# Patient Record
Sex: FEMALE | Race: BLACK OR AFRICAN AMERICAN | NOT HISPANIC OR LATINO | Employment: FULL TIME | URBAN - METROPOLITAN AREA
[De-identification: names, ages, dates, MRNs, and addresses within clinical notes are randomized per-mention and may not be internally consistent; named-entity substitution may affect disease eponyms.]

---

## 2021-03-03 DIAGNOSIS — Z23 NEED FOR VACCINATION: ICD-10-CM

## 2023-02-16 ENCOUNTER — HOSPITAL ENCOUNTER (OUTPATIENT)
Dept: RADIOLOGY | Facility: MEDICAL CENTER | Age: 68
End: 2023-02-16
Attending: FAMILY MEDICINE
Payer: COMMERCIAL

## 2023-02-16 ENCOUNTER — OFFICE VISIT (OUTPATIENT)
Dept: MEDICAL GROUP | Facility: MEDICAL CENTER | Age: 68
End: 2023-02-16
Payer: COMMERCIAL

## 2023-02-16 VITALS
BODY MASS INDEX: 31.52 KG/M2 | DIASTOLIC BLOOD PRESSURE: 78 MMHG | HEART RATE: 68 BPM | SYSTOLIC BLOOD PRESSURE: 126 MMHG | OXYGEN SATURATION: 98 % | WEIGHT: 208 LBS | RESPIRATION RATE: 16 BRPM | HEIGHT: 68 IN | TEMPERATURE: 97.6 F

## 2023-02-16 DIAGNOSIS — E66.9 OBESITY (BMI 30-39.9): ICD-10-CM

## 2023-02-16 DIAGNOSIS — Z12.31 ENCOUNTER FOR SCREENING MAMMOGRAM FOR MALIGNANT NEOPLASM OF BREAST: ICD-10-CM

## 2023-02-16 DIAGNOSIS — M25.551 RIGHT HIP PAIN: ICD-10-CM

## 2023-02-16 DIAGNOSIS — Z13.6 SCREENING FOR CARDIOVASCULAR CONDITION: ICD-10-CM

## 2023-02-16 DIAGNOSIS — Z12.11 SCREEN FOR COLON CANCER: ICD-10-CM

## 2023-02-16 DIAGNOSIS — Z11.59 NEED FOR HEPATITIS C SCREENING TEST: ICD-10-CM

## 2023-02-16 DIAGNOSIS — Z13.29 SCREENING FOR ENDOCRINE, METABOLIC AND IMMUNITY DISORDER: ICD-10-CM

## 2023-02-16 DIAGNOSIS — Z13.228 SCREENING FOR ENDOCRINE, METABOLIC AND IMMUNITY DISORDER: ICD-10-CM

## 2023-02-16 DIAGNOSIS — Z13.820 SCREENING FOR OSTEOPOROSIS: ICD-10-CM

## 2023-02-16 DIAGNOSIS — Z13.0 SCREENING FOR ENDOCRINE, METABOLIC AND IMMUNITY DISORDER: ICD-10-CM

## 2023-02-16 DIAGNOSIS — Z78.0 POSTMENOPAUSAL: ICD-10-CM

## 2023-02-16 PROCEDURE — 73501 X-RAY EXAM HIP UNI 1 VIEW: CPT | Mod: RT

## 2023-02-16 PROCEDURE — 99203 OFFICE O/P NEW LOW 30 MIN: CPT | Performed by: FAMILY MEDICINE

## 2023-02-16 RX ORDER — MELOXICAM 15 MG/1
TABLET ORAL
COMMUNITY
End: 2023-02-16

## 2023-02-16 ASSESSMENT — PATIENT HEALTH QUESTIONNAIRE - PHQ9: CLINICAL INTERPRETATION OF PHQ2 SCORE: 0

## 2023-02-16 NOTE — PROGRESS NOTES
"  Subjective:     All Chung is a 68 y.o. female presenting to establish care.      No current outpatient medications on file.    Objective:     Vitals: /78   Pulse 68   Temp 36.4 °C (97.6 °F)   Resp 16   Ht 1.727 m (5' 8\")   Wt 94.3 kg (208 lb)   SpO2 98%   BMI 31.63 kg/m²   General: Alert  HEENT: Normocephalic.   Heart: Regular rate and rhythm.  S1 and S2 normal.  No murmurs appreciated.  Respiratory: Normal respiratory effort.  Clear to auscultation bilaterally.  Abdomen: Non-distended, soft  Extremities: No leg edema.    Assessment/Plan:     All was seen today for [facility].    Diagnoses and all orders for this visit:    Right hip pain  Chronic, worsening.  She reports right groin pain for the past 3 years or so that seems to be worsening.  Is not limiting her in her activities yet, but she did see orthopedics in the past.  Surgery was recommended at the time, but she was hesitant to proceed.  She would like to monitor for now.  She has been trying to lose weight, eats healthy overall.  She has not been exercising much, but plans to start walking more regularly.  -     DX-HIP-UNILATERAL-WITH PELVIS-1 VIEW RIGHT; Future    Obesity (BMI 30-39.9)  Chronic, stable, we discussed a healthy diet, regular exercise.  She is not interested in medication options at this time.  We will continue to monitor.  Follow-up in 3 months.  -     Patient identified as having weight management issue.  Appropriate orders and counseling given.    Screening for endocrine, metabolic and immunity disorder  -     CBC WITHOUT DIFFERENTIAL; Future  -     Comp Metabolic Panel; Future  -     TSH WITH REFLEX TO FT4; Future    Screening for cardiovascular condition  -     Lipid Profile; Future    Need for hepatitis C screening test  -     HEP C VIRUS ANTIBODY; Future    Encounter for screening mammogram for malignant neoplasm of breast  -     MA-SCREENING MAMMO BILAT W/TOMOSYNTHESIS W/CAD; Future    Screen for colon " cancer  -     OCCULT BLOOD FECES IMMUNOASSAY; Future    Screening for osteoporosis  Postmenopausal  -     DS-BONE DENSITY STUDY (DEXA); Future          Return in about 3 months (around 5/16/2023) for routine follow up.

## 2023-02-23 ENCOUNTER — HOSPITAL ENCOUNTER (OUTPATIENT)
Dept: LAB | Facility: MEDICAL CENTER | Age: 68
End: 2023-02-23
Attending: FAMILY MEDICINE
Payer: COMMERCIAL

## 2023-02-23 DIAGNOSIS — Z11.59 NEED FOR HEPATITIS C SCREENING TEST: ICD-10-CM

## 2023-02-23 DIAGNOSIS — Z13.228 SCREENING FOR ENDOCRINE, METABOLIC AND IMMUNITY DISORDER: ICD-10-CM

## 2023-02-23 DIAGNOSIS — Z13.0 SCREENING FOR ENDOCRINE, METABOLIC AND IMMUNITY DISORDER: ICD-10-CM

## 2023-02-23 DIAGNOSIS — Z13.29 SCREENING FOR ENDOCRINE, METABOLIC AND IMMUNITY DISORDER: ICD-10-CM

## 2023-02-23 DIAGNOSIS — Z13.6 SCREENING FOR CARDIOVASCULAR CONDITION: ICD-10-CM

## 2023-02-23 LAB
ALBUMIN SERPL BCP-MCNC: 4.4 G/DL (ref 3.2–4.9)
ALBUMIN/GLOB SERPL: 1.3 G/DL
ALP SERPL-CCNC: 105 U/L (ref 30–99)
ALT SERPL-CCNC: 14 U/L (ref 2–50)
ANION GAP SERPL CALC-SCNC: 17 MMOL/L (ref 7–16)
AST SERPL-CCNC: 27 U/L (ref 12–45)
BILIRUB SERPL-MCNC: 0.6 MG/DL (ref 0.1–1.5)
BUN SERPL-MCNC: 10 MG/DL (ref 8–22)
CALCIUM ALBUM COR SERPL-MCNC: 9.7 MG/DL (ref 8.5–10.5)
CALCIUM SERPL-MCNC: 10 MG/DL (ref 8.5–10.5)
CHLORIDE SERPL-SCNC: 103 MMOL/L (ref 96–112)
CHOLEST SERPL-MCNC: 204 MG/DL (ref 100–199)
CO2 SERPL-SCNC: 19 MMOL/L (ref 20–33)
CREAT SERPL-MCNC: 0.81 MG/DL (ref 0.5–1.4)
ERYTHROCYTE [DISTWIDTH] IN BLOOD BY AUTOMATED COUNT: 41.2 FL (ref 35.9–50)
GFR SERPLBLD CREATININE-BSD FMLA CKD-EPI: 79 ML/MIN/1.73 M 2
GLOBULIN SER CALC-MCNC: 3.4 G/DL (ref 1.9–3.5)
GLUCOSE SERPL-MCNC: 73 MG/DL (ref 65–99)
HCT VFR BLD AUTO: 51.3 % (ref 37–47)
HCV AB SER QL: NORMAL
HDLC SERPL-MCNC: 86 MG/DL
HGB BLD-MCNC: 17.3 G/DL (ref 12–16)
LDLC SERPL CALC-MCNC: 104 MG/DL
MCH RBC QN AUTO: 30.6 PG (ref 27–33)
MCHC RBC AUTO-ENTMCNC: 33.7 G/DL (ref 33.6–35)
MCV RBC AUTO: 90.8 FL (ref 81.4–97.8)
PLATELET # BLD AUTO: 246 K/UL (ref 164–446)
PMV BLD AUTO: 10.5 FL (ref 9–12.9)
POTASSIUM SERPL-SCNC: 4.2 MMOL/L (ref 3.6–5.5)
PROT SERPL-MCNC: 7.8 G/DL (ref 6–8.2)
RBC # BLD AUTO: 5.65 M/UL (ref 4.2–5.4)
SODIUM SERPL-SCNC: 139 MMOL/L (ref 135–145)
TRIGL SERPL-MCNC: 72 MG/DL (ref 0–149)
TSH SERPL DL<=0.005 MIU/L-ACNC: 1.39 UIU/ML (ref 0.38–5.33)
WBC # BLD AUTO: 7.1 K/UL (ref 4.8–10.8)

## 2023-02-23 PROCEDURE — 86803 HEPATITIS C AB TEST: CPT

## 2023-02-23 PROCEDURE — 36415 COLL VENOUS BLD VENIPUNCTURE: CPT

## 2023-02-23 PROCEDURE — 80053 COMPREHEN METABOLIC PANEL: CPT

## 2023-02-23 PROCEDURE — 84443 ASSAY THYROID STIM HORMONE: CPT

## 2023-02-23 PROCEDURE — 85027 COMPLETE CBC AUTOMATED: CPT

## 2023-02-23 PROCEDURE — 80061 LIPID PANEL: CPT

## 2023-02-24 PROBLEM — D58.2 ELEVATED HEMOGLOBIN (HCC): Status: ACTIVE | Noted: 2023-02-24

## 2023-04-13 ENCOUNTER — APPOINTMENT (OUTPATIENT)
Dept: RADIOLOGY | Facility: MEDICAL CENTER | Age: 68
End: 2023-04-13
Payer: COMMERCIAL

## 2023-05-23 ENCOUNTER — HOSPITAL ENCOUNTER (OUTPATIENT)
Dept: RADIOLOGY | Facility: MEDICAL CENTER | Age: 68
End: 2023-05-23
Attending: FAMILY MEDICINE
Payer: COMMERCIAL

## 2023-05-23 DIAGNOSIS — Z78.0 POSTMENOPAUSAL: ICD-10-CM

## 2023-05-23 DIAGNOSIS — Z13.820 SCREENING FOR OSTEOPOROSIS: ICD-10-CM

## 2023-05-23 DIAGNOSIS — Z12.31 ENCOUNTER FOR SCREENING MAMMOGRAM FOR MALIGNANT NEOPLASM OF BREAST: ICD-10-CM

## 2023-05-23 PROBLEM — M85.88 OSTEOPENIA OF LUMBAR SPINE: Status: ACTIVE | Noted: 2023-05-23

## 2023-05-23 PROCEDURE — 77080 DXA BONE DENSITY AXIAL: CPT

## 2023-05-23 PROCEDURE — 77063 BREAST TOMOSYNTHESIS BI: CPT

## 2023-06-14 ENCOUNTER — HOSPITAL ENCOUNTER (OUTPATIENT)
Dept: LAB | Facility: MEDICAL CENTER | Age: 68
End: 2023-06-14
Attending: FAMILY MEDICINE
Payer: COMMERCIAL

## 2023-06-14 ENCOUNTER — OFFICE VISIT (OUTPATIENT)
Dept: MEDICAL GROUP | Facility: MEDICAL CENTER | Age: 68
End: 2023-06-14
Payer: COMMERCIAL

## 2023-06-14 ENCOUNTER — HOSPITAL ENCOUNTER (OUTPATIENT)
Dept: RADIOLOGY | Facility: MEDICAL CENTER | Age: 68
End: 2023-06-14
Attending: FAMILY MEDICINE
Payer: COMMERCIAL

## 2023-06-14 VITALS
WEIGHT: 209 LBS | TEMPERATURE: 96.2 F | HEIGHT: 67 IN | HEART RATE: 78 BPM | DIASTOLIC BLOOD PRESSURE: 64 MMHG | SYSTOLIC BLOOD PRESSURE: 132 MMHG | BODY MASS INDEX: 32.8 KG/M2 | OXYGEN SATURATION: 94 %

## 2023-06-14 DIAGNOSIS — D58.2 ELEVATED HEMOGLOBIN (HCC): ICD-10-CM

## 2023-06-14 DIAGNOSIS — R58 ECCHYMOSIS: ICD-10-CM

## 2023-06-14 DIAGNOSIS — M79.674 PAIN OF TOE OF RIGHT FOOT: ICD-10-CM

## 2023-06-14 DIAGNOSIS — E66.9 OBESITY (BMI 30-39.9): ICD-10-CM

## 2023-06-14 LAB
APPEARANCE UR: CLEAR
BASOPHILS # BLD AUTO: 0.9 % (ref 0–1.8)
BASOPHILS # BLD: 0.06 K/UL (ref 0–0.12)
BILIRUB UR QL STRIP.AUTO: NEGATIVE
COLOR UR: YELLOW
EOSINOPHIL # BLD AUTO: 0.1 K/UL (ref 0–0.51)
EOSINOPHIL NFR BLD: 1.5 % (ref 0–6.9)
ERYTHROCYTE [DISTWIDTH] IN BLOOD BY AUTOMATED COUNT: 42.1 FL (ref 35.9–50)
ERYTHROCYTE [SEDIMENTATION RATE] IN BLOOD BY WESTERGREN METHOD: 8 MM/HOUR (ref 0–25)
GLUCOSE UR STRIP.AUTO-MCNC: NEGATIVE MG/DL
HCT VFR BLD AUTO: 49.3 % (ref 37–47)
HGB BLD-MCNC: 16.2 G/DL (ref 12–16)
IMM GRANULOCYTES # BLD AUTO: 0.02 K/UL (ref 0–0.11)
IMM GRANULOCYTES NFR BLD AUTO: 0.3 % (ref 0–0.9)
INR PPP: 0.98 (ref 0.87–1.13)
KETONES UR STRIP.AUTO-MCNC: NEGATIVE MG/DL
LEUKOCYTE ESTERASE UR QL STRIP.AUTO: NEGATIVE
LYMPHOCYTES # BLD AUTO: 2.44 K/UL (ref 1–4.8)
LYMPHOCYTES NFR BLD: 35.9 % (ref 22–41)
MCH RBC QN AUTO: 29.7 PG (ref 27–33)
MCHC RBC AUTO-ENTMCNC: 32.9 G/DL (ref 32.2–35.5)
MCV RBC AUTO: 90.3 FL (ref 81.4–97.8)
MICRO URNS: NORMAL
MONOCYTES # BLD AUTO: 0.73 K/UL (ref 0–0.85)
MONOCYTES NFR BLD AUTO: 10.8 % (ref 0–13.4)
NEUTROPHILS # BLD AUTO: 3.44 K/UL (ref 1.82–7.42)
NEUTROPHILS NFR BLD: 50.6 % (ref 44–72)
NITRITE UR QL STRIP.AUTO: NEGATIVE
NRBC # BLD AUTO: 0 K/UL
NRBC BLD-RTO: 0 /100 WBC (ref 0–0.2)
PH UR STRIP.AUTO: 6 [PH] (ref 5–8)
PLATELET # BLD AUTO: 232 K/UL (ref 164–446)
PMV BLD AUTO: 10.5 FL (ref 9–12.9)
PROT UR QL STRIP: NEGATIVE MG/DL
PROTHROMBIN TIME: 12.9 SEC (ref 12–14.6)
RBC # BLD AUTO: 5.46 M/UL (ref 4.2–5.4)
RBC UR QL AUTO: NEGATIVE
SP GR UR STRIP.AUTO: 1.01
UROBILINOGEN UR STRIP.AUTO-MCNC: 0.2 MG/DL
VIT B12 SERPL-MCNC: 1897 PG/ML (ref 211–911)
WBC # BLD AUTO: 6.8 K/UL (ref 4.8–10.8)

## 2023-06-14 PROCEDURE — 86140 C-REACTIVE PROTEIN: CPT

## 2023-06-14 PROCEDURE — 82607 VITAMIN B-12: CPT

## 2023-06-14 PROCEDURE — 84165 PROTEIN E-PHORESIS SERUM: CPT

## 2023-06-14 PROCEDURE — 3075F SYST BP GE 130 - 139MM HG: CPT | Performed by: FAMILY MEDICINE

## 2023-06-14 PROCEDURE — 81003 URINALYSIS AUTO W/O SCOPE: CPT

## 2023-06-14 PROCEDURE — 36415 COLL VENOUS BLD VENIPUNCTURE: CPT

## 2023-06-14 PROCEDURE — 73630 X-RAY EXAM OF FOOT: CPT | Mod: RT

## 2023-06-14 PROCEDURE — 85025 COMPLETE CBC W/AUTO DIFF WBC: CPT

## 2023-06-14 PROCEDURE — 84155 ASSAY OF PROTEIN SERUM: CPT

## 2023-06-14 PROCEDURE — 85652 RBC SED RATE AUTOMATED: CPT

## 2023-06-14 PROCEDURE — 99214 OFFICE O/P EST MOD 30 MIN: CPT | Performed by: FAMILY MEDICINE

## 2023-06-14 PROCEDURE — 85610 PROTHROMBIN TIME: CPT

## 2023-06-14 PROCEDURE — 80053 COMPREHEN METABOLIC PANEL: CPT

## 2023-06-14 PROCEDURE — 3078F DIAST BP <80 MM HG: CPT | Performed by: FAMILY MEDICINE

## 2023-06-14 ASSESSMENT — FIBROSIS 4 INDEX: FIB4 SCORE: 1.97

## 2023-06-14 NOTE — PROGRESS NOTES
"  Subjective:     All Chung is a 68 y.o. female presenting for a follow-up      No current outpatient medications on file.    Objective:     Vitals: /64 (BP Location: Right arm, Patient Position: Sitting, BP Cuff Size: Adult)   Pulse 78   Temp (!) 35.7 °C (96.2 °F) (Temporal)   Ht 1.702 m (5' 7\")   Wt 94.8 kg (208 lb 15.9 oz)   SpO2 94%   BMI 32.73 kg/m²   General: Alert  HEENT: Normocephalic.   Extremities: No leg edema.  Ecchymosis present at the base of the right fourth toe    Assessment/Plan:     All was seen today for hip pain.    Diagnoses and all orders for this visit:    Pain of toe of right foot  Ecchymosis  Acute, uncomplicated issue.  She noticed bruising and discomfort on the base of her right fourth toe a couple days ago.  Seems to be improving.  Associated with some tingling in the toes.  We discussed checking x-rays  -     DX-FOOT-COMPLETE 3+ RIGHT; Future    Elevated hemoglobin (HCC)  Undiagnosed new problem with uncertain prognosis.  We discussed her last labs.  Her hemoglobin level is slightly elevated.  We discussed rechecking with the following other labs.  She eats healthy.  She has been working on exercising more.  She denies any sleep apnea symptoms.  We discussed a referral to hematology if her hemoglobin remains elevated.  -     CBC WITH DIFFERENTIAL; Future  -     URINALYSIS,CULTURE IF INDICATED; Future  -     Comp Metabolic Panel; Future  -     VITAMIN B12; Future  -     Prothrombin Time; Future  -     SPEP W/REFLEX TO MAYTE, A, G, M; Future  -     Sed Rate; Future  -     CRP QUANTITIVE (NON-CARDIAC); Future    Obesity (BMI 30-39.9)  Chronic, stable.  She continues to eat healthy.  She has been working on trying exercise.  We will continue to monitor.  Follow-up in 3 months        Return in about 3 months (around 9/14/2023) for routine follow up.      "

## 2023-06-15 LAB
ALBUMIN SERPL BCP-MCNC: 4 G/DL (ref 3.2–4.9)
ALBUMIN/GLOB SERPL: 1.3 G/DL
ALP SERPL-CCNC: 105 U/L (ref 30–99)
ALT SERPL-CCNC: 15 U/L (ref 2–50)
ANION GAP SERPL CALC-SCNC: 11 MMOL/L (ref 7–16)
AST SERPL-CCNC: 16 U/L (ref 12–45)
BILIRUB SERPL-MCNC: 0.5 MG/DL (ref 0.1–1.5)
BUN SERPL-MCNC: 9 MG/DL (ref 8–22)
CALCIUM ALBUM COR SERPL-MCNC: 9.5 MG/DL (ref 8.5–10.5)
CALCIUM SERPL-MCNC: 9.5 MG/DL (ref 8.5–10.5)
CHLORIDE SERPL-SCNC: 103 MMOL/L (ref 96–112)
CO2 SERPL-SCNC: 25 MMOL/L (ref 20–33)
CREAT SERPL-MCNC: 0.85 MG/DL (ref 0.5–1.4)
CRP SERPL HS-MCNC: 1.03 MG/DL (ref 0–0.75)
GFR SERPLBLD CREATININE-BSD FMLA CKD-EPI: 75 ML/MIN/1.73 M 2
GLOBULIN SER CALC-MCNC: 3.2 G/DL (ref 1.9–3.5)
GLUCOSE SERPL-MCNC: 78 MG/DL (ref 65–99)
POTASSIUM SERPL-SCNC: 3.8 MMOL/L (ref 3.6–5.5)
PROT SERPL-MCNC: 7.2 G/DL (ref 6–8.2)
SODIUM SERPL-SCNC: 139 MMOL/L (ref 135–145)

## 2023-06-17 LAB
ALBUMIN SERPL ELPH-MCNC: 3.96 G/DL (ref 3.75–5.01)
ALPHA1 GLOB SERPL ELPH-MCNC: 0.32 G/DL (ref 0.19–0.46)
ALPHA2 GLOB SERPL ELPH-MCNC: 0.71 G/DL (ref 0.48–1.05)
B-GLOBULIN SERPL ELPH-MCNC: 0.88 G/DL (ref 0.48–1.1)
GAMMA GLOB SERPL ELPH-MCNC: 1.14 G/DL (ref 0.62–1.51)
INTERPRETATION SERPL IFE-IMP: NORMAL
MONOCLON BAND OBS SERPL: NORMAL
MONOCLONAL PROTEIN NL11656: NORMAL G/DL
PATHOLOGY STUDY: NORMAL
PROT SERPL-MCNC: 7 G/DL (ref 6.3–8.2)

## 2023-10-09 ENCOUNTER — DOCUMENTATION (OUTPATIENT)
Dept: HEALTH INFORMATION MANAGEMENT | Facility: OTHER | Age: 68
End: 2023-10-09
Payer: COMMERCIAL

## 2023-12-29 ENCOUNTER — HOSPITAL ENCOUNTER (EMERGENCY)
Facility: MEDICAL CENTER | Age: 68
End: 2024-01-01
Payer: COMMERCIAL

## 2023-12-29 ENCOUNTER — APPOINTMENT (OUTPATIENT)
Dept: RADIOLOGY | Facility: MEDICAL CENTER | Age: 68
End: 2023-12-29
Attending: EMERGENCY MEDICINE
Payer: COMMERCIAL

## 2023-12-29 ENCOUNTER — HOSPITAL ENCOUNTER (EMERGENCY)
Facility: MEDICAL CENTER | Age: 68
End: 2023-12-29
Attending: EMERGENCY MEDICINE
Payer: COMMERCIAL

## 2023-12-29 VITALS
DIASTOLIC BLOOD PRESSURE: 78 MMHG | OXYGEN SATURATION: 97 % | HEIGHT: 67 IN | RESPIRATION RATE: 17 BRPM | TEMPERATURE: 97.7 F | WEIGHT: 202.82 LBS | BODY MASS INDEX: 31.83 KG/M2 | SYSTOLIC BLOOD PRESSURE: 147 MMHG | HEART RATE: 88 BPM

## 2023-12-29 DIAGNOSIS — R03.0 ELEVATED BLOOD PRESSURE READING: ICD-10-CM

## 2023-12-29 DIAGNOSIS — R10.9 FLANK PAIN: ICD-10-CM

## 2023-12-29 DIAGNOSIS — D25.9 UTERINE LEIOMYOMA, UNSPECIFIED LOCATION: ICD-10-CM

## 2023-12-29 DIAGNOSIS — R91.1 NODULE OF RIGHT LUNG: ICD-10-CM

## 2023-12-29 LAB
ALBUMIN SERPL BCP-MCNC: 4.2 G/DL (ref 3.2–4.9)
ALBUMIN/GLOB SERPL: 1.2 G/DL
ALP SERPL-CCNC: 108 U/L (ref 30–99)
ALT SERPL-CCNC: 17 U/L (ref 2–50)
ANION GAP SERPL CALC-SCNC: 14 MMOL/L (ref 7–16)
APPEARANCE UR: CLEAR
AST SERPL-CCNC: 22 U/L (ref 12–45)
BASOPHILS # BLD AUTO: 0.6 % (ref 0–1.8)
BASOPHILS # BLD: 0.04 K/UL (ref 0–0.12)
BILIRUB SERPL-MCNC: 0.6 MG/DL (ref 0.1–1.5)
BILIRUB UR QL STRIP.AUTO: NEGATIVE
BUN SERPL-MCNC: 10 MG/DL (ref 8–22)
CALCIUM ALBUM COR SERPL-MCNC: 9.7 MG/DL (ref 8.5–10.5)
CALCIUM SERPL-MCNC: 9.9 MG/DL (ref 8.5–10.5)
CHLORIDE SERPL-SCNC: 102 MMOL/L (ref 96–112)
CO2 SERPL-SCNC: 20 MMOL/L (ref 20–33)
COLOR UR: YELLOW
CREAT SERPL-MCNC: 0.86 MG/DL (ref 0.5–1.4)
EOSINOPHIL # BLD AUTO: 0.07 K/UL (ref 0–0.51)
EOSINOPHIL NFR BLD: 1 % (ref 0–6.9)
ERYTHROCYTE [DISTWIDTH] IN BLOOD BY AUTOMATED COUNT: 42.3 FL (ref 35.9–50)
GFR SERPLBLD CREATININE-BSD FMLA CKD-EPI: 74 ML/MIN/1.73 M 2
GLOBULIN SER CALC-MCNC: 3.6 G/DL (ref 1.9–3.5)
GLUCOSE SERPL-MCNC: 81 MG/DL (ref 65–99)
GLUCOSE UR STRIP.AUTO-MCNC: NEGATIVE MG/DL
HCT VFR BLD AUTO: 50.7 % (ref 37–47)
HGB BLD-MCNC: 16.9 G/DL (ref 12–16)
IMM GRANULOCYTES # BLD AUTO: 0.01 K/UL (ref 0–0.11)
IMM GRANULOCYTES NFR BLD AUTO: 0.1 % (ref 0–0.9)
KETONES UR STRIP.AUTO-MCNC: ABNORMAL MG/DL
LEUKOCYTE ESTERASE UR QL STRIP.AUTO: NEGATIVE
LIPASE SERPL-CCNC: 43 U/L (ref 11–82)
LYMPHOCYTES # BLD AUTO: 2.47 K/UL (ref 1–4.8)
LYMPHOCYTES NFR BLD: 36.1 % (ref 22–41)
MCH RBC QN AUTO: 30 PG (ref 27–33)
MCHC RBC AUTO-ENTMCNC: 33.3 G/DL (ref 32.2–35.5)
MCV RBC AUTO: 90.1 FL (ref 81.4–97.8)
MICRO URNS: ABNORMAL
MONOCYTES # BLD AUTO: 0.68 K/UL (ref 0–0.85)
MONOCYTES NFR BLD AUTO: 9.9 % (ref 0–13.4)
NEUTROPHILS # BLD AUTO: 3.57 K/UL (ref 1.82–7.42)
NEUTROPHILS NFR BLD: 52.3 % (ref 44–72)
NITRITE UR QL STRIP.AUTO: NEGATIVE
NRBC # BLD AUTO: 0 K/UL
NRBC BLD-RTO: 0 /100 WBC (ref 0–0.2)
PH UR STRIP.AUTO: 5 [PH] (ref 5–8)
PLATELET # BLD AUTO: 224 K/UL (ref 164–446)
PMV BLD AUTO: 10.2 FL (ref 9–12.9)
POTASSIUM SERPL-SCNC: 4.2 MMOL/L (ref 3.6–5.5)
PROT SERPL-MCNC: 7.8 G/DL (ref 6–8.2)
PROT UR QL STRIP: NEGATIVE MG/DL
RBC # BLD AUTO: 5.63 M/UL (ref 4.2–5.4)
RBC UR QL AUTO: NEGATIVE
SODIUM SERPL-SCNC: 136 MMOL/L (ref 135–145)
SP GR UR STRIP.AUTO: 1.01
UROBILINOGEN UR STRIP.AUTO-MCNC: 0.2 MG/DL
WBC # BLD AUTO: 6.8 K/UL (ref 4.8–10.8)

## 2023-12-29 PROCEDURE — 85025 COMPLETE CBC W/AUTO DIFF WBC: CPT

## 2023-12-29 PROCEDURE — 36415 COLL VENOUS BLD VENIPUNCTURE: CPT

## 2023-12-29 PROCEDURE — 700117 HCHG RX CONTRAST REV CODE 255: Performed by: EMERGENCY MEDICINE

## 2023-12-29 PROCEDURE — 81003 URINALYSIS AUTO W/O SCOPE: CPT

## 2023-12-29 PROCEDURE — 80053 COMPREHEN METABOLIC PANEL: CPT

## 2023-12-29 PROCEDURE — 83690 ASSAY OF LIPASE: CPT

## 2023-12-29 PROCEDURE — 99284 EMERGENCY DEPT VISIT MOD MDM: CPT

## 2023-12-29 PROCEDURE — 74177 CT ABD & PELVIS W/CONTRAST: CPT

## 2023-12-29 RX ADMIN — IOHEXOL 100 ML: 350 INJECTION, SOLUTION INTRAVENOUS at 15:15

## 2023-12-29 NOTE — ED PROVIDER NOTES
"ED Provider Note    CHIEF COMPLAINT  Chief Complaint   Patient presents with    Abdominal Pain     L sided flank pain that radiates front to back x1 week. Pt denies dysuria, or N/V           HPI/ROS    All Chung is a 68 y.o. female who presents to the emergency department complaining of left flank and low back pain.  The patient has had the symptoms on and off for quite a long time and has spoken to her previous primary care doctor about this but no diagnosis has been made.  Over the last 1 week the pain has gotten worse than usual and seems to be constant over the last 1 week.  She does not recognize any specific exacerbating or alleviating factors or precipitating events.  Pain is mild to moderate.  Review of systems: No respiratory symptoms or shortness of breath no hemoptysis or chest pain no pain or discomfort with urination no difficulty with bowel movement.  There is been no trauma.    PAST MEDICAL HISTORY       SURGICAL HISTORY  patient denies any surgical history    FAMILY HISTORY  History reviewed. No pertinent family history.    SOCIAL HISTORY  Social History     Tobacco Use    Smoking status: Never    Smokeless tobacco: Never   Substance and Sexual Activity    Alcohol use: Not on file    Drug use: Not Currently     Comment: marijuana    Sexual activity: Not on file       CURRENT MEDICATIONS  Home Medications       Reviewed by Allie Lacy R.N. (Registered Nurse) on 12/29/23 at 1132  Med List Status: Partial     Medication Last Dose Status        Patient Spencer Taking any Medications                           ALLERGIES  No Known Allergies    PHYSICAL EXAM  VITAL SIGNS: BP (!) 182/86   Pulse 92   Temp 36.6 °C (97.8 °F) (Oral)   Resp 18   Ht 1.702 m (5' 7\")   Wt 92 kg (202 lb 13.2 oz)   SpO2 98%   BMI 31.77 kg/m²    Constitutional: Awake Lucid verbal very pleasant individual she does not appear toxic or distressed  Eyes: No erythema discharge or jaundice  Neck: Trachea midline no " JVD  Cardiovascular: Regular rate and rhythm  Respiratory: Clear bilaterally with no apparent difficulty breathing  Abdomen: Soft nontender nondistended no rebound guarding or peritoneal findings there is some tenderness to percussion of the left CVA  Back: No midline bony tenderness of the thoracic or lumbar spine  Skin: Warm and dry  Musculoskeletal: No acute bony deformity no leg edema or calf tenderness  Neurologic: Awake lucid verbal and moving all extremities        DIAGNOSTIC STUDIES / PROCEDURES    LABS  CBC shows a normal white blood cell count of 6.8 hemoglobin is adequate at 16.9 basic metabolic panel is unremarkable liver functions were unremarkable except for a minimally elevated alk phos of 108 urinalysis is negative for nitrite leukocyte Estrace and blood ketones were trace positive    RADIOLOGY  Radiologist interpretation:   CT-ABDOMEN-PELVIS WITH   Final Result      1.  No acute intra-abdominal findings.      2.  5 mm right middle lobe nodule is nonspecific. If the patient is at high risk for malignancy, consider low dose follow-up chest CT in 12 months      3.  Left renal scarring      4.  Leiomyomatous uterus            COURSE & MEDICAL DECISION MAKING  In the emergency department evaluation was conducted and I have reviewed all the findings with the patient, I offered pain medication but she did not require any at this time.  I have also reviewed the incidental findings of lung nodule left renal scarring and leiomyomatous uterus with the patient.  At this point in time I think it is safe for her to go home I have recommended that she establish a primary care doctor and I have placed a referral in the computer for referral to establish primary care doctor for further evaluation and follow-up on an outpatient basis.  If she develops new or worsening symptoms or pain is out of control she is to return here for recheck.  Also noted that her blood pressure measurements were elevated today and  encouraged her to keep a log of her blood pressures at home she does have a blood pressure machine available and says that she has never had elevated readings before so she is to keep the record and take this to her primary care doctor at follow-up    FINAL DIAGNOSIS  1. Flank pain    2. Nodule of right lung    3. Uterine leiomyoma, unspecified location    4. Elevated blood pressure reading           Electronically signed by: Alonso Franks M.D., 12/29/2023 3:26 PM

## 2023-12-29 NOTE — ED NOTES
Agree with triage note. Patient ambulated to [facility] [address] with steady gait. Patient to the restroom to provide UA and provided with gown. Chart up for ERP.

## 2023-12-29 NOTE — ED TRIAGE NOTES
"Chief Complaint   Patient presents with    Abdominal Pain     L sided flank pain that radiates front to back x1 week. Pt denies dysuria, or N/V       Pt ambulatory into triage for above. Pt denies hx of kidney stones. Pt aox4, gcs 15  Protocol ordered, provided pt with urine cup        BP (!) 152/88   Pulse 92   Temp 36.6 °C (97.8 °F) (Oral)   Resp 16   Ht 1.702 m (5' 7\")   Wt 92 kg (202 lb 13.2 oz)   SpO2 98%   BMI 31.77 kg/m²     "

## 2023-12-29 NOTE — DISCHARGE INSTRUCTIONS
Record your blood pressure and the log book every day at home and take this to your primary care doctor for evaluation.  If you have not heard from the  by early next week call [phone] and schedule a primary care office follow-up appointment as soon as possible.  The incidental findings of small lung nodule will need follow-up by your doctor within the next 12 months.  If you feel you are developing new or worsening symptoms or pain is out of control return here for recheck.

## 2024-02-27 ENCOUNTER — APPOINTMENT (OUTPATIENT)
Dept: MEDICAL GROUP | Facility: MEDICAL CENTER | Age: 69
End: 2024-02-27
Payer: COMMERCIAL

## 2025-04-03 ENCOUNTER — HOSPITAL ENCOUNTER (EMERGENCY)
Facility: MEDICAL CENTER | Age: 70
End: 2025-04-03
Attending: EMERGENCY MEDICINE
Payer: COMMERCIAL

## 2025-04-03 VITALS
SYSTOLIC BLOOD PRESSURE: 158 MMHG | OXYGEN SATURATION: 98 % | DIASTOLIC BLOOD PRESSURE: 82 MMHG | HEIGHT: 67 IN | TEMPERATURE: 97.2 F | BODY MASS INDEX: 32.53 KG/M2 | WEIGHT: 207.23 LBS | RESPIRATION RATE: 16 BRPM | HEART RATE: 80 BPM

## 2025-04-03 DIAGNOSIS — M25.551 RIGHT HIP PAIN: ICD-10-CM

## 2025-04-03 DIAGNOSIS — B02.9 HERPES ZOSTER WITHOUT COMPLICATION: ICD-10-CM

## 2025-04-03 PROCEDURE — 99282 EMERGENCY DEPT VISIT SF MDM: CPT

## 2025-04-03 RX ORDER — HYDROCODONE BITARTRATE AND ACETAMINOPHEN 5; 325 MG/1; MG/1
1 TABLET ORAL EVERY 6 HOURS PRN
Qty: 15 TABLET | Refills: 0 | Status: SHIPPED | OUTPATIENT
Start: 2025-04-03 | End: 2025-04-08

## 2025-04-03 RX ORDER — VALACYCLOVIR HYDROCHLORIDE 1 G/1
1000 TABLET, FILM COATED ORAL 3 TIMES DAILY
Qty: 21 TABLET | Refills: 0 | Status: ACTIVE | OUTPATIENT
Start: 2025-04-03 | End: 2025-04-10

## 2025-04-03 ASSESSMENT — PAIN DESCRIPTION - PAIN TYPE: TYPE: ACUTE PAIN;CHRONIC PAIN

## 2025-04-03 ASSESSMENT — FIBROSIS 4 INDEX: FIB4 SCORE: 1.64

## 2025-04-03 NOTE — ED PROVIDER NOTES
"  ER Provider Note    Scribed for Maikol Marinelli M.D. by Td Lopez. 4/3/2025   11:57 AM    Primary Care Provider: Pcp Pt States None    CHIEF COMPLAINT  Chief Complaint   Patient presents with    Groin Pain     Chronic R groin pain worse within last week, R side groin rash began last few days, pain radiates to R leg, 7/10 intermittent pain      EXTERNAL RECORDS REVIEWED  Outpatient Notes Patient was seen 12/29/2023 for left flank pain.     HPI/ROS  LIMITATION TO HISTORY   Select: : None  OUTSIDE HISTORIAN(S):  None.     All Chung is a 70 y.o. female who presents to the ED for evaluation of worsening right groin pain onset one week ago ago. Patient reports that she has arthritis of her hip, which causes her chronic groin pain. Last night she had pain shooting down her right leg, which is new to her and caused her further concern. She noticed a rash to her right groin a few days ago, and she describes having 7/10 pain. She has been treating with cold presses at home.     PAST MEDICAL HISTORY  Past Medical History:   Diagnosis Date    Hepatitis B     acute, resolved       SURGICAL HISTORY  Past Surgical History:   Procedure Laterality Date    ORIF, ANKLE Left     SHOULDER SURGERY Right        FAMILY HISTORY  Family History   Problem Relation Age of Onset    Hypertension Mother     Dementia Mother     Hypertension Father     Alzheimer's Disease Father     Breast Cancer Maternal Grandmother        SOCIAL HISTORY   reports that she has never smoked. She has never used smokeless tobacco. She reports current alcohol use. She reports that she does not currently use drugs after having used the following drugs: Marijuana.    CURRENT MEDICATIONS  Previous Medications    No medications on file       ALLERGIES  No Known Allergies     PHYSICAL EXAM  BP (!) 164/84   Pulse 84   Temp 36 °C (96.8 °F) (Temporal)   Resp 16   Ht 1.702 m (5' 7\")   Wt 94 kg (207 lb 3.7 oz)   SpO2 97%   BMI 32.46 kg/m²    Nursing " note and vitals reviewed.  Constitutional: Well-developed and well-nourished. No distress.   HENT: Head is normocephalic and atraumatic. Oropharynx is clear and moist without exudate or erythema.   Eyes: Pupils are equal, round, and reactive to light. Conjunctiva are normal.   Cardiovascular: Normal rate and regular rhythm. No murmur heard. Normal radial pulses.  Pulmonary/Chest: Breath sounds normal. No wheezes or rales.   Abdominal: Soft and non-tender. No distention    Musculoskeletal: Extremities exhibit normal range of motion without edema or tenderness.   Neurological: Awake, alert and oriented to person, place, and time. No focal deficits noted.  Skin: Skin is warm and dry. vesicular rash on medial aspect of the right thigh and a dermatomal distribution consistent with shingles.   Psychiatric: Normal mood and affect. Appropriate for clinical situation        ASSESSMENT AND PLAN    11:57 AM - Patient was evaluated at bedside. Patient presents today with new shooting pains down her right leg with her chronic right groin pain due to hip arthritis. She also noticed a rash a few days ago, and her clinical presentation is consistent with shingles. I discussed treatment of her shingles at home with Valtrex and pain medicaiton. I discussed plan for discharge and follow up as outlined below. The patient is stable for discharge at this time and will return for any new or worsening symptoms. Patient verbalizes understanding and support with my plan for discharge.      DISPOSITION AND DISCUSSIONS    I have discussed management of the patient with the following physicians and LENKA's:  None.     Discussion of management with other QHP or appropriate source(s): None     Barriers to care at this time, including but not limited to: Patient does not have established PCP.     Decision tools and prescription drugs considered including, but not limited to:  Valtrex and Norco .     The patient will return for new or worsening  symptoms and is stable at the time of discharge.      I reviewed prescription monitoring program for patient's narcotic use before prescribing a scheduled drug.The patient will not drink alcohol nor drive with prescribed medications. The patient will return for new or worsening symptoms and is stable at the time of discharge.    In prescribing controlled substances to this patient, I certify that I have obtained and reviewed the medical history of All Chung. I have also made a good lily effort to obtain applicable records from other providers who have treated the patient and records did not demonstrate any increased risk of substance abuse that would prevent me from prescribing controlled substances.     I have conducted a physical exam and documented it. I have reviewed Ms. Chung’s prescription history as maintained by the [place] Prescription Monitoring Program.     I have assessed the patient’s risk for abuse, dependency, and addiction using the validated Opioid Risk Tool available at https://www.mdcalc.com/aazche-lnxv-tzuk-ort-narcotic-abuse.     Given the above, I believe the benefits of controlled substance therapy outweigh the risks. The reasons for prescribing controlled substances include non-narcotic, oral analgesic alternatives have been inadequate for pain control. Accordingly, I have discussed the risk and benefits, treatment plan, and alternative therapies with the patient.      DISPOSITION:  Patient will be discharged home in stable condition.    FOLLOW UP:  [facility], Emergency Dept  [address] [place] [id number] [phone]    If symptoms worsen    [facility]  [address] [place] [id number]  [phone]  Schedule an appointment as soon as possible for a visit         OUTPATIENT MEDICATIONS:  New Prescriptions    HYDROCODONE-ACETAMINOPHEN (NORCO) 5-325 MG TAB PER TABLET    Take 1 Tablet by mouth every 6 hours as needed (pain) for up to 5  days.    VALACYCLOVIR (VALTREX) 1 GM TAB    Take 1 Tablet by mouth 3 times a day for 7 days.        FINAL DIAGNOSIS  1. Herpes zoster without complication    2. Right hip pain         Td HARO (Scribe), am scribing for, and in the presence of, Maikol Marinelli M.D..    Electronically signed by: Td Lopez (Shivaniibe), 4/3/2025    Maikol HARO M.D. personally performed the services described in this documentation, as scribed by Td Lopez in my presence, and it is both accurate and complete.      The note accurately reflects work and decisions made by me.  Maikol Marinelli M.D.  4/3/2025  4:19 PM

## 2025-04-03 NOTE — ED TRIAGE NOTES
"Chief Complaint   Patient presents with    Groin Pain     Chronic R groin pain worse within last week, R side groin rash began last few days, pain radiates to R leg, 7/10 intermittent pain         Ambulated to triage for above complaint.     Past Medical History:   Diagnosis Date    Hepatitis B     acute, resolved       Pt educated of triage process and possible wait times. Pt informed to contact staff if status changes or with any developing concerns, pt returned to lobby.     BP (!) 164/84   Pulse 84   Temp 36 °C (96.8 °F) (Temporal)   Resp 16   Ht 1.702 m (5' 7\")   Wt 94 kg (207 lb 3.7 oz)   SpO2 97%   BMI 32.46 kg/m²      "

## 2025-04-09 ENCOUNTER — OFFICE VISIT (OUTPATIENT)
Dept: MEDICAL GROUP | Age: 70
End: 2025-04-09
Payer: COMMERCIAL

## 2025-04-09 VITALS
WEIGHT: 203.6 LBS | BODY MASS INDEX: 32.72 KG/M2 | HEART RATE: 80 BPM | HEIGHT: 66 IN | TEMPERATURE: 97.1 F | OXYGEN SATURATION: 93 % | SYSTOLIC BLOOD PRESSURE: 130 MMHG | DIASTOLIC BLOOD PRESSURE: 74 MMHG

## 2025-04-09 DIAGNOSIS — E78.2 MIXED DYSLIPIDEMIA: ICD-10-CM

## 2025-04-09 DIAGNOSIS — E66.9 OBESITY (BMI 30-39.9): ICD-10-CM

## 2025-04-09 DIAGNOSIS — B02.9 HERPES ZOSTER WITHOUT COMPLICATION: ICD-10-CM

## 2025-04-09 DIAGNOSIS — Z12.31 ENCOUNTER FOR SCREENING MAMMOGRAM FOR BREAST CANCER: ICD-10-CM

## 2025-04-09 DIAGNOSIS — Z12.11 COLON CANCER SCREENING: ICD-10-CM

## 2025-04-09 DIAGNOSIS — Z00.00 BLOOD TESTS FOR ROUTINE GENERAL PHYSICAL EXAMINATION: ICD-10-CM

## 2025-04-09 PROCEDURE — 3075F SYST BP GE 130 - 139MM HG: CPT | Performed by: STUDENT IN AN ORGANIZED HEALTH CARE EDUCATION/TRAINING PROGRAM

## 2025-04-09 PROCEDURE — 99214 OFFICE O/P EST MOD 30 MIN: CPT | Performed by: STUDENT IN AN ORGANIZED HEALTH CARE EDUCATION/TRAINING PROGRAM

## 2025-04-09 PROCEDURE — 3078F DIAST BP <80 MM HG: CPT | Performed by: STUDENT IN AN ORGANIZED HEALTH CARE EDUCATION/TRAINING PROGRAM

## 2025-04-09 RX ORDER — GABAPENTIN 100 MG/1
100 CAPSULE ORAL 3 TIMES DAILY
Qty: 90 CAPSULE | Refills: 1 | Status: SHIPPED | OUTPATIENT
Start: 2025-04-09

## 2025-04-09 RX ORDER — MELOXICAM 15 MG/1
1 TABLET ORAL
COMMUNITY

## 2025-04-09 RX ORDER — HYDROCODONE BITARTRATE AND ACETAMINOPHEN 5; 325 MG/1; MG/1
1 TABLET ORAL EVERY 4 HOURS PRN
COMMUNITY

## 2025-04-09 ASSESSMENT — ENCOUNTER SYMPTOMS
DOUBLE VISION: 0
PALPITATIONS: 0
COUGH: 0
SHORTNESS OF BREATH: 0
ORTHOPNEA: 0
DIZZINESS: 0
BLURRED VISION: 0
FEVER: 0
DEPRESSION: 0
BRUISES/BLEEDS EASILY: 0
CHILLS: 0
WEAKNESS: 0
ABDOMINAL PAIN: 0
HEADACHES: 0
BACK PAIN: 0
BLOOD IN STOOL: 0

## 2025-04-09 ASSESSMENT — PATIENT HEALTH QUESTIONNAIRE - PHQ9: CLINICAL INTERPRETATION OF PHQ2 SCORE: 0

## 2025-04-09 ASSESSMENT — FIBROSIS 4 INDEX: FIB4 SCORE: 1.64

## 2025-04-09 ASSESSMENT — LIFESTYLE VARIABLES: SUBSTANCE_ABUSE: 0

## 2025-04-09 NOTE — PROGRESS NOTES
Subjective:     CC: Establish care.    HPI:   History of Present Illness  The patient is a 70-year-old female presenting to [facility].    She was previously under the care of Dr. Amin at [facility] but has not had any blood work done in approximately 1.5 years. She is due for a colonoscopy but has expressed disinterest in undergoing the procedure. She maintains a healthy diet and is currently in the process of losing weight. She reports occasional neck pain but has not detected any lumps. She has a history of smoking marijuana, which she quit about a year ago, and does not consume alcohol.    She experienced a shingles outbreak last Tuesday, which necessitated an emergency room visit. The outbreak was characterized by the presence of blisters. She was prescribed Valtrex and oxycodone for pain management, but the latter did not provide significant relief. She also reports difficulty sleeping and working due to the pain. She plans to receive a shingles vaccine after her current episode resolves.    She has arthritis in her hip, which has been exacerbated by the shingles outbreak. She is currently in the process of losing weight due to her hip condition and has declined hip surgery. She plans to resume exercise, including walking and using a rebounder at home.    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 She used to smoke pot but quit about a year ago. She never drinks alcohol.    MEDICATIONS  Current: Valtrex, oxycodone    IMMUNIZATIONS  She received a COVID-19 vaccination.       ROS:  Review of Systems   Constitutional:  Negative for chills, fever and malaise/fatigue.   HENT:  Negative for nosebleeds and tinnitus.    Eyes:  Negative for blurred vision and double vision.   Respiratory:  Negative for cough and shortness of breath.    Cardiovascular:  Negative for chest pain, palpitations, orthopnea and leg swelling.   Gastrointestinal:  Negative for abdominal pain, blood in  "stool and melena.   Genitourinary:  Negative for dysuria, frequency and urgency.   Musculoskeletal:  Negative for back pain and joint pain.   Skin:  Positive for rash. Negative for itching.   Neurological:  Negative for dizziness, weakness and headaches.   Endo/Heme/Allergies:  Does not bruise/bleed easily.   Psychiatric/Behavioral:  Negative for depression, substance abuse and suicidal ideas.        Objective:     Exam:  /74 (BP Location: Right arm, Patient Position: Sitting, BP Cuff Size: Large adult)   Pulse 80   Temp 36.2 °C (97.1 °F) (Temporal)   Ht 1.673 m (5' 5.87\")   Wt 92.4 kg (203 lb 9.6 oz)   SpO2 93%   BMI 33.00 kg/m²  Body mass index is 33 kg/m².    Physical Exam  Vitals reviewed.   Constitutional:       General: She is not in acute distress.  HENT:      Head: Normocephalic and atraumatic.      Mouth/Throat:      Mouth: Mucous membranes are moist.   Eyes:      General: No scleral icterus.     Extraocular Movements: Extraocular movements intact.      Pupils: Pupils are equal, round, and reactive to light.   Cardiovascular:      Rate and Rhythm: Normal rate and regular rhythm.      Pulses: Normal pulses.      Heart sounds: Normal heart sounds. No murmur heard.  Pulmonary:      Effort: Pulmonary effort is normal. No respiratory distress.      Breath sounds: Normal breath sounds. No wheezing.   Abdominal:      General: There is no distension.      Palpations: Abdomen is soft.      Tenderness: There is no abdominal tenderness. There is no guarding or rebound.   Musculoskeletal:      Cervical back: Normal range of motion and neck supple.      Right lower leg: No edema.      Left lower leg: No edema.        Legs:       Comments: Clustered blisters in the right upper inner thigh.   Skin:     General: Skin is warm.      Capillary Refill: Capillary refill takes less than 2 seconds.      Coloration: Skin is not jaundiced.      Findings: No bruising.   Neurological:      General: No focal deficit " present.      Mental Status: She is alert.      Cranial Nerves: No cranial nerve deficit.      Sensory: No sensory deficit.   Psychiatric:         Mood and Affect: Mood normal.         Behavior: Behavior normal.       Labs: Ordered.    Assessment & Plan:       1. Herpes zoster without complication  She was seen in the emergency room and diagnosed with shingles. She was prescribed Valtrex and oxycodone for pain management, but the oxycodone has not been effective. Gabapentin has been prescribed to manage her neuropathic pain, with instructions to take one capsule every 8 hours. If there is no improvement within a week, she may increase the dosage to two capsules every 8 hours. Potential side effects, including drowsiness, have been discussed, and she has been advised against driving if she experiences this symptom. A shingles vaccination will be administered after her current episode resolves.  - gabapentin (NEURONTIN) 100 MG Cap; Take 1 Capsule by mouth 3 times a day.  Dispense: 90 Capsule; Refill: 1    2. Mixed dyslipidemia  -Chronic, Treated with diet and exercise  -Repeat blood work to look great ASCVD score  - Comp Metabolic Panel; Future  - Lipid Profile; Future  - TSH; Future    3. Obesity (BMI 30-39.9)  -Encouraged weight loss, regular exercise, DASH diet or plant-based diet recommended.  - Patient identified as having weight management issue.  Appropriate orders and counseling given.    4. Encounter for screening mammogram for breast cancer  -Routine screening  - MA-SCREENING MAMMO BILAT W/CAD; Future    5. Colon cancer screening  -Routine screening  - Cologuard® colon cancer screening    6. Blood tests for routine general physical examination  -Routine blood work  - CBC WITH DIFFERENTIAL; Future  - HEMOGLOBIN A1C; Future  - VITAMIN D,25 HYDROXY (DEFICIENCY); Future      Hip arthritis.  She reports increased hip pain, which may be exacerbated by her recent shingles outbreak. She is advised to continue with  weight loss and consider low-impact exercises like using her rebounder at home.    Return in about 4 weeks (around 5/7/2025) for Labs, Meds.    Please note that this dictation was created using voice recognition software. I have made every reasonable attempt to correct obvious errors, but I expect that there are errors of grammar and possibly content that I did not discover before finalizing the note.

## 2025-04-09 NOTE — PATIENT INSTRUCTIONS
-Take Gabapentin 100 mg TID if no improvement within 72 hrs may increase dose to 200 mg TID  -Complete blood work   -Follow up in 4 weeks

## 2025-04-29 ENCOUNTER — OFFICE VISIT (OUTPATIENT)
Dept: MEDICAL GROUP | Age: 70
End: 2025-04-29
Payer: COMMERCIAL

## 2025-04-29 VITALS
WEIGHT: 203 LBS | SYSTOLIC BLOOD PRESSURE: 130 MMHG | TEMPERATURE: 97.2 F | OXYGEN SATURATION: 96 % | HEART RATE: 74 BPM | HEIGHT: 66 IN | DIASTOLIC BLOOD PRESSURE: 78 MMHG | BODY MASS INDEX: 32.62 KG/M2

## 2025-04-29 DIAGNOSIS — B02.9 HERPES ZOSTER WITHOUT COMPLICATION: ICD-10-CM

## 2025-04-29 DIAGNOSIS — E78.2 MIXED DYSLIPIDEMIA: ICD-10-CM

## 2025-04-29 ASSESSMENT — ENCOUNTER SYMPTOMS
BLURRED VISION: 0
DIZZINESS: 0
PALPITATIONS: 0
CHILLS: 0
WHEEZING: 0
SHORTNESS OF BREATH: 0
ORTHOPNEA: 0
DEPRESSION: 0
BRUISES/BLEEDS EASILY: 0
DOUBLE VISION: 0
FEVER: 0
ABDOMINAL PAIN: 0
HEADACHES: 0
WEAKNESS: 0
COUGH: 0
BACK PAIN: 0
BLOOD IN STOOL: 0

## 2025-04-29 ASSESSMENT — FIBROSIS 4 INDEX: FIB4 SCORE: 1.64

## 2025-04-29 ASSESSMENT — LIFESTYLE VARIABLES: SUBSTANCE_ABUSE: 0

## 2025-04-29 NOTE — PROGRESS NOTES
"Subjective:     CC: Shingles follow-up.    HPI:   History of Present Illness  The patient is a 70-year-old female presenting for follow-up.    The chief complaint is the follow-up for lab work. She reports an improvement in her overall health status, with the resolution of her shingles and associated pain. The current medication regimen for shingles is no longer needed.     Blood work and mammogram are scheduled for tomorrow. An appointment for labs was missed last Wednesday due to paving at her apartment building, which restricted car access.     She is planning to move back to [place] in June, where she intends to purchase a house.        ROS:  Review of Systems   Constitutional:  Negative for chills, fever and malaise/fatigue.   HENT:  Negative for nosebleeds and tinnitus.    Eyes:  Negative for blurred vision and double vision.   Respiratory:  Negative for cough, shortness of breath and wheezing.    Cardiovascular:  Negative for chest pain, palpitations, orthopnea and leg swelling.   Gastrointestinal:  Negative for abdominal pain, blood in stool and melena.   Genitourinary:  Negative for dysuria and urgency.   Musculoskeletal:  Negative for back pain and joint pain.   Skin:  Negative for rash.   Neurological:  Negative for dizziness, weakness and headaches.   Endo/Heme/Allergies:  Does not bruise/bleed easily.   Psychiatric/Behavioral:  Negative for depression, substance abuse and suicidal ideas.        Objective:     Exam:  /78 (BP Location: Right arm, Patient Position: Sitting, BP Cuff Size: Large adult)   Pulse 74   Temp 36.2 °C (97.2 °F) (Temporal)   Ht 1.673 m (5' 5.87\")   Wt 92.1 kg (203 lb)   SpO2 96%   BMI 32.89 kg/m²  Body mass index is 32.89 kg/m².    Physical Exam  Vitals reviewed.   Constitutional:       General: She is not in acute distress.  HENT:      Head: Normocephalic and atraumatic.      Mouth/Throat:      Mouth: Mucous membranes are moist.   Eyes:      General: No scleral icterus.    "  Extraocular Movements: Extraocular movements intact.      Pupils: Pupils are equal, round, and reactive to light.   Cardiovascular:      Rate and Rhythm: Normal rate and regular rhythm.      Pulses: Normal pulses.      Heart sounds: Normal heart sounds. No murmur heard.  Pulmonary:      Effort: Pulmonary effort is normal. No respiratory distress.      Breath sounds: Normal breath sounds. No wheezing.   Abdominal:      General: There is no distension.      Palpations: Abdomen is soft.      Tenderness: There is no abdominal tenderness. There is no guarding or rebound.   Musculoskeletal:      Cervical back: Normal range of motion and neck supple.      Right lower leg: No edema.      Left lower leg: No edema.   Skin:     General: Skin is warm.      Capillary Refill: Capillary refill takes less than 2 seconds.      Coloration: Skin is not jaundiced.      Findings: No bruising.   Neurological:      General: No focal deficit present.      Mental Status: She is alert.      Cranial Nerves: No cranial nerve deficit.   Psychiatric:         Mood and Affect: Mood normal.         Behavior: Behavior normal.       Labs: Pending    Assessment & Plan:       1. Herpes zoster without complication  - Reports resolution of shingles and associated pain.  - Physical exam findings confirm no ongoing symptoms.  - Discussion includes discontinuation of medication.  - Advised to discontinue current medication regimen.    2. Mixed dyslipidemia  - History of mild mixed dyslipidemia-most recent blood work in the beginning of 2023 showed total cholesterol of 204 and LDL cholesterol 104.  - Currently not taking lowering cholesterol drugs, dyslipidemia managed with diet and exercise.  - Complete blood work and follow-up with me within a month.  - Continue exercising regularly, maintaining healthy weight, DASH diet or plant-based diet.    Health Maintenance.  - Mammogram and blood test scheduled for 04/30/2025.  - Reminder to fast before the blood  test.  - Discussion includes the importance of completing lab work before moving in 06/2025.  - Follow-up appointment scheduled in 3 weeks to review lab results.      Return in about 3 weeks (around 5/20/2025) for Labs.    Please note that this dictation was created using voice recognition software. I have made every reasonable attempt to correct obvious errors, but I expect that there are errors of grammar and possibly content that I did not discover before finalizing the note.

## 2025-04-30 ENCOUNTER — APPOINTMENT (OUTPATIENT)
Dept: RADIOLOGY | Facility: MEDICAL CENTER | Age: 70
End: 2025-04-30
Attending: STUDENT IN AN ORGANIZED HEALTH CARE EDUCATION/TRAINING PROGRAM
Payer: COMMERCIAL

## 2025-04-30 ENCOUNTER — APPOINTMENT (OUTPATIENT)
Facility: MEDICAL CENTER | Age: 70
End: 2025-04-30
Payer: COMMERCIAL